# Patient Record
Sex: FEMALE | Race: OTHER | NOT HISPANIC OR LATINO
[De-identification: names, ages, dates, MRNs, and addresses within clinical notes are randomized per-mention and may not be internally consistent; named-entity substitution may affect disease eponyms.]

---

## 2019-10-18 PROBLEM — Z00.00 ENCOUNTER FOR PREVENTIVE HEALTH EXAMINATION: Status: ACTIVE | Noted: 2019-10-18

## 2019-10-22 ENCOUNTER — APPOINTMENT (OUTPATIENT)
Dept: RADIATION ONCOLOGY | Facility: CLINIC | Age: 44
End: 2019-10-22
Payer: COMMERCIAL

## 2019-10-22 VITALS
SYSTOLIC BLOOD PRESSURE: 124 MMHG | TEMPERATURE: 97.88 F | HEIGHT: 65 IN | RESPIRATION RATE: 16 BRPM | DIASTOLIC BLOOD PRESSURE: 83 MMHG | WEIGHT: 181 LBS | HEART RATE: 71 BPM | OXYGEN SATURATION: 99 % | BODY MASS INDEX: 30.16 KG/M2

## 2019-10-22 DIAGNOSIS — G43.909 MIGRAINE, UNSPECIFIED, NOT INTRACTABLE, W/OUT STATUS MIGRAINOSUS: ICD-10-CM

## 2019-10-22 DIAGNOSIS — I10 ESSENTIAL (PRIMARY) HYPERTENSION: ICD-10-CM

## 2019-10-22 DIAGNOSIS — Z78.9 OTHER SPECIFIED HEALTH STATUS: ICD-10-CM

## 2019-10-22 DIAGNOSIS — Z80.3 FAMILY HISTORY OF MALIGNANT NEOPLASM OF BREAST: ICD-10-CM

## 2019-10-22 PROCEDURE — 99244 OFF/OP CNSLTJ NEW/EST MOD 40: CPT | Mod: GC,25

## 2019-10-22 RX ORDER — LISINOPRIL AND HYDROCHLOROTHIAZIDE TABLETS 20; 12.5 MG/1; MG/1
20-12.5 TABLET ORAL
Refills: 0 | Status: ACTIVE | COMMUNITY
Start: 2019-10-22

## 2019-10-22 NOTE — VITALS
[Maximal Pain Intensity: 5/10] : 5/10 [Least Pain Intensity: 0/10] : 0/10 [90: Able to carry normal activity; minor signs or symptoms of disease.] : 90: Able to carry normal activity; minor signs or symptoms of disease.

## 2019-10-22 NOTE — HISTORY OF PRESENT ILLNESS
[FreeTextEntry1] : 44 year old with newly diagnosed WHO grade I meningioma s/p surgical resection.  She presents for discussion re adjuvant radiation therapy.\par \par She presented to [facility] ED on 8/25/19 with the worse headache of her life, "100/10" to her whole head.  On 8/25/19 CT angiography of the head revealed 3.2 cm x 1.9 cm dural based lesion at right frontal lobe with associated vasogenic edema. On 8/28/19 patient had resection of brain lesion at [facility]. Pathology revealed right frontal angiomatous meningioma, WHO grade I.\par \par On 10/1/19 CT head shows: Since 9/11/19 interval decrease in regional edema in right frontal lobe and craniotomy fragment from temporal bone is now near anatomical position. small fluid collection extra axial, adjacent to the craniotomy site. \par \par MRI brain 10/1/19 showed no restricted diffusion. No hemorrhage or focal mass. Nonspecific postoperative peripheral and meningeal enhancement associated with the surgical bed. Presence of residual tumor or infection/abscess cannot be excluded.\par \par Since surgery she still has slight headache to the right sided operative site. Notes left side is slightly weaker since surgery, associated with numbness to the whole side. Numbness comes and goes. Right sided vision is not the same as before surgery. Denies dizziness, confusion, trouble walking. With intermittent nausea and constipation since surgery. Urinating without difficulty.

## 2019-10-22 NOTE — PHYSICAL EXAM
[Sclera] : the sclera and conjunctiva were normal [Outer Ear] : the ears and nose were normal in appearance [Extraocular Movements] : extraocular movements were intact [Normal] : oriented to person, place and time, the affect was normal, the mood was normal and not anxious

## 2019-10-22 NOTE — LETTER CLOSING
[Consult Closing:] : Thank you for allowing me to participate in the care of this patient.  If you have any questions, please do not hesitate to contact me. [Sincerely yours,] : Sincerely yours, [FreeTextEntry3] : Norbert Moreira MD\par Attending Physician\par Department of Radiation Medicine\par \par \par

## 2019-10-22 NOTE — REVIEW OF SYSTEMS
[Constipation] : constipation [Insomnia] : insomnia [Negative] : Genitourinary [Fever] : no fever [Chills] : no chills [Night Sweats] : no night sweats [Fatigue] : no fatigue [Loss of Hearing] : no loss of hearing [Dysphagia] : no dysphagia [Shortness Of Breath] : no shortness of breath [Diarrhea] : no diarrhea [Cough] : no cough [Confused] : no confusion [Dizziness] : no dizziness [Fainting] : no fainting [Difficulty Walking] : no difficulty walking [Anxiety] : no anxiety [Hot Flashes] : no hot flashes [Depression] : no depression [Muscle Weakness] : no muscle weakness [FreeTextEntry3] : right sided vision slightly blurry after surgery [FreeTextEntry7] : intermittent nausea [FreeTextEntry9] : left maria guadalupe weak after surgery [de-identified] : headaches up to 5/10 to right head operative site. improved with tylenol. Has not had a migraine since surgery

## 2020-01-07 ENCOUNTER — APPOINTMENT (OUTPATIENT)
Dept: RADIATION ONCOLOGY | Facility: CLINIC | Age: 45
End: 2020-01-07
Payer: COMMERCIAL

## 2020-01-07 VITALS
SYSTOLIC BLOOD PRESSURE: 152 MMHG | HEART RATE: 74 BPM | DIASTOLIC BLOOD PRESSURE: 91 MMHG | OXYGEN SATURATION: 99 % | RESPIRATION RATE: 16 BRPM | TEMPERATURE: 98.11 F

## 2020-01-07 PROCEDURE — 99212 OFFICE O/P EST SF 10 MIN: CPT

## 2020-01-11 NOTE — HISTORY OF PRESENT ILLNESS
[FreeTextEntry1] : 44 year old with newly diagnosed WHO grade I meningioma s/p surgical resection.  She presented to me initially on 10/22/19 for discussion re adjuvant radiation therapy.\par \par RELEVANT MEDICAL HISTORY\par She presented to [facility] ED on 8/25/19 with the worse headache of her life, "100/10" to her whole head.  On 8/25/19 CT angiography of the head revealed 3.2 cm x 1.9 cm dural based lesion at right frontal lobe with associated vasogenic edema. On 8/28/19 patient had resection of brain lesion at [place]. Pathology revealed right frontal angiomatous meningioma, WHO grade I.\par \par On 10/1/19 CT head shows: Since 9/11/19 interval decrease in regional edema in right frontal lobe and craniotomy fragment from temporal bone is now near anatomical position. small fluid collection extra axial, adjacent to the craniotomy site. \par \par MRI brain 10/1/19 showed no restricted diffusion. No hemorrhage or focal mass. Nonspecific postoperative peripheral and meningeal enhancement associated with the surgical bed. Presence of residual tumor or infection/abscess cannot be excluded.\par \par 10/22/19- Since surgery she still has slight headache to the right sided operative site. Notes left side is slightly weaker since surgery, associated with numbness to the whole side. Numbness comes and goes. Right sided vision is not the same as before surgery. Denies dizziness, confusion, trouble walking. With intermittent nausea and constipation since surgery. Urinating without difficulty. At this time she was advised to have a 6 month post surgical MRI and then see us for follow up. \par \par 1/7/19- Ms. Melendez presents today for follow up. She is feeling very well today. Her left sided weakness has completely improved. She has not had any migraines since surgery, has had no headaches. 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 Has not yet had an MRI.

## 2020-01-11 NOTE — PHYSICAL EXAM
[Sclera] : the sclera and conjunctiva were normal [Extraocular Movements] : extraocular movements were intact [Outer Ear] : the ears and nose were normal in appearance [Normal] : no joint swelling, no clubbing or cyanosis of the fingernails and muscle strength and tone were normal [de-identified] : well healed surgical scar to right front of head

## 2020-01-11 NOTE — REVIEW OF SYSTEMS
[Constipation] : constipation [Negative] : Respiratory [Fever] : no fever [Chills] : no chills [Night Sweats] : no night sweats [Fatigue] : no fatigue [Dysphagia] : no dysphagia [Loss of Hearing] : no loss of hearing [Shortness Of Breath] : no shortness of breath [Diarrhea] : no diarrhea [Cough] : no cough [Confused] : no confusion [Fainting] : no fainting [Dizziness] : no dizziness [Difficulty Walking] : no difficulty walking [Insomnia] : no insomnia [Anxiety] : no anxiety [Depression] : no depression [Hot Flashes] : no hot flashes [Muscle Weakness] : no muscle weakness [FreeTextEntry3] : vision on right side is now back to normal [FreeTextEntry7] : intermittent nausea [de-identified] : denies headaches, migrained.  [FreeTextEntry9] : left sided weakness is now 100% improved [de-identified] : no longer struggling with insomnia

## 2020-03-12 ENCOUNTER — FORM ENCOUNTER (OUTPATIENT)
Age: 45
End: 2020-03-12

## 2020-03-13 ENCOUNTER — OUTPATIENT (OUTPATIENT)
Dept: OUTPATIENT SERVICES | Facility: HOSPITAL | Age: 45
LOS: 1 days | End: 2020-03-13
Payer: COMMERCIAL

## 2020-03-13 ENCOUNTER — APPOINTMENT (OUTPATIENT)
Dept: MRI IMAGING | Facility: CLINIC | Age: 45
End: 2020-03-13
Payer: COMMERCIAL

## 2020-03-13 DIAGNOSIS — D32.9 BENIGN NEOPLASM OF MENINGES, UNSPECIFIED: ICD-10-CM

## 2020-03-13 PROCEDURE — 70553 MRI BRAIN STEM W/O & W/DYE: CPT

## 2020-03-13 PROCEDURE — 70553 MRI BRAIN STEM W/O & W/DYE: CPT | Mod: 26

## 2020-03-13 PROCEDURE — A9585: CPT

## 2020-07-07 ENCOUNTER — APPOINTMENT (OUTPATIENT)
Dept: RADIATION ONCOLOGY | Facility: CLINIC | Age: 45
End: 2020-07-07
Payer: COMMERCIAL

## 2020-07-07 PROCEDURE — 99024 POSTOP FOLLOW-UP VISIT: CPT

## 2020-07-07 RX ORDER — AMLODIPINE BESYLATE 5 MG/1
5 TABLET ORAL
Refills: 0 | Status: DISCONTINUED | COMMUNITY
Start: 2019-10-22 | End: 2020-07-07

## 2020-07-08 NOTE — PHYSICAL EXAM
[Extraocular Movements] : extraocular movements were intact [de-identified] : UNABLE TO DO physical exam due to telehealth

## 2020-07-08 NOTE — HISTORY OF PRESENT ILLNESS
[Home] : at home, [unfilled] , at the time of the visit. [Medical Office: ([facility])___] : at the medical office located in  [Verbal consent obtained from patient] : the patient, [unfilled] [FreeTextEntry1] : 44 year old with newly diagnosed WHO grade I meningioma s/p surgical resection.  She presented to me initially on 10/22/19 for discussion re adjuvant radiation therapy.\par \par RELEVANT MEDICAL HISTORY\par She presented to [facility] ED on 8/25/19 with the worse headache of her life, "100/10" to her whole head.  On 8/25/19 CT angiography of the head revealed 3.2 cm x 1.9 cm dural based lesion at right frontal lobe with associated vasogenic edema. On 8/28/19 patient had resection of brain lesion at [place]. Pathology revealed right frontal angiomatous meningioma, WHO grade I.\par \par On 10/1/19 CT head shows: Since 9/11/19 interval decrease in regional edema in right frontal lobe and craniotomy fragment from temporal bone is now near anatomical position. small fluid collection extra axial, adjacent to the craniotomy site. \par \par MRI brain 10/1/19 showed no restricted diffusion. No hemorrhage or focal mass. Nonspecific postoperative peripheral and meningeal enhancement associated with the surgical bed. Presence of residual tumor or infection/abscess cannot be excluded.\par \par 10/22/19- Since surgery she still has slight headache to the right sided operative site. Notes left side is slightly weaker since surgery, associated with numbness to the whole side. Numbness comes and goes. Right sided vision is not the same as before surgery. Denies dizziness, confusion, trouble walking. With intermittent nausea and constipation since surgery. Urinating without difficulty. At this time she was advised to have a 6 month post surgical MRI and then see us for follow up. \par \par 1/7/19- Ms. Melendez presents today for follow up. She is feeling very well today. Her left sided weakness has completely improved. She has not had any migraines since surgery, has had no headaches. 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 Melendez presents today for follow up. She is seen today through TELEPHONE follow up, as she had some difficulty with using the telehealth platform.  \par She underwent a repeat MRI on 3/13/2020 which showed resolving postop changes with no evidence of recurrent or residual neoplasm. \par The pathology from her surgery was reviewed in 9/2019 at [facility]. review showed angiomatous meningioma, WHO grade I. \par A request for dotatate PET scan was submitted to her insurance however this was denied.\par \par Today she feels overall well. Denies headaches, focal weakness, numbness, tingling, nausea, vomiting. Continues to work

## 2020-07-08 NOTE — REVIEW OF SYSTEMS
[Constipation] : constipation [Negative] : Cardiovascular [Chills] : no chills [Fever] : no fever [Fatigue] : no fatigue [Night Sweats] : no night sweats [Dysphagia] : no dysphagia [Loss of Hearing] : no loss of hearing [Shortness Of Breath] : no shortness of breath [Diarrhea] : no diarrhea [Cough] : no cough [Fainting] : no fainting [Dizziness] : no dizziness [Confused] : no confusion [Difficulty Walking] : no difficulty walking [Anxiety] : no anxiety [Insomnia] : no insomnia [Hot Flashes] : no hot flashes [Depression] : no depression [Muscle Weakness] : no muscle weakness [FreeTextEntry9] : left sided weakness is now 100% improved [FreeTextEntry3] : right sided vision now normal [FreeTextEntry7] : intermittent nausea [de-identified] : denies headaches, migraines [de-identified] : no longer struggling with insomnia

## 2021-03-15 ENCOUNTER — APPOINTMENT (OUTPATIENT)
Dept: MRI IMAGING | Facility: IMAGING CENTER | Age: 46
End: 2021-03-15
Payer: COMMERCIAL

## 2021-03-15 ENCOUNTER — OUTPATIENT (OUTPATIENT)
Dept: OUTPATIENT SERVICES | Facility: HOSPITAL | Age: 46
LOS: 1 days | End: 2021-03-15
Payer: COMMERCIAL

## 2021-03-15 DIAGNOSIS — D32.9 BENIGN NEOPLASM OF MENINGES, UNSPECIFIED: ICD-10-CM

## 2021-03-15 DIAGNOSIS — Z00.8 ENCOUNTER FOR OTHER GENERAL EXAMINATION: ICD-10-CM

## 2021-03-15 PROCEDURE — A9585: CPT

## 2021-03-15 PROCEDURE — 70553 MRI BRAIN STEM W/O & W/DYE: CPT | Mod: 26

## 2021-03-15 PROCEDURE — 70553 MRI BRAIN STEM W/O & W/DYE: CPT

## 2021-03-16 ENCOUNTER — APPOINTMENT (OUTPATIENT)
Dept: RADIATION ONCOLOGY | Facility: CLINIC | Age: 46
End: 2021-03-16
Payer: COMMERCIAL

## 2021-03-16 PROCEDURE — 99442: CPT

## 2021-03-17 NOTE — REVIEW OF SYSTEMS
[Negative] : Genitourinary [Fever] : no fever [Chills] : no chills [Night Sweats] : no night sweats [Fatigue] : no fatigue [Dysphagia] : no dysphagia [Loss of Hearing] : no loss of hearing [Chest Pain] : no chest pain [Palpitations] : no palpitations [Shortness Of Breath] : no shortness of breath [Cough] : no cough [Abdominal Pain] : no abdominal pain [Vomiting] : no vomiting [Constipation] : no constipation [Diarrhea] : no diarrhea [Confused] : no confusion [Dizziness] : no dizziness [Fainting] : no fainting [Difficulty Walking] : no difficulty walking [Insomnia] : no insomnia [Anxiety] : no anxiety [Depression] : no depression [Hot Flashes] : no hot flashes [Muscle Weakness] : no muscle weakness [FreeTextEntry3] : vision now improved [FreeTextEntry7] : intermittent nausea [FreeTextEntry9] : left sided weakness is now 100% improved [de-identified] : denies headaches, migraines [de-identified] : insomnia now improved

## 2021-03-17 NOTE — HISTORY OF PRESENT ILLNESS
[Other Location: e.g. School (Enter Location, City,State)___] : at [unfilled], at the time of the visit. [Medical Office: ([facility])___] : at the medical office located in  [Verbal consent obtained from patient] : the patient, [unfilled] [FreeTextEntry1] : 44 year old with WHO grade I meningioma s/p surgical resection.  She presented to me initially on 10/22/19 for discussion re adjuvant radiation therapy.\par \par RELEVANT MEDICAL HISTORY\par She presented to [facility] ED on 8/25/19 with the worse headache of her life, "100/10" to her whole head.  On 8/25/19 CT angiography of the head revealed 3.2 cm x 1.9 cm dural based lesion at right frontal lobe with associated vasogenic edema. On 8/28/19 patient had resection of brain lesion at [place]. Pathology revealed right frontal angiomatous meningioma, WHO grade I.\par \par On 10/1/19 CT head shows: Since 9/11/19 interval decrease in regional edema in right frontal lobe and craniotomy fragment from temporal bone is now near anatomical position. small fluid collection extra axial, adjacent to the craniotomy site. \par \par MRI brain 10/1/19 showed no restricted diffusion. No hemorrhage or focal mass. Nonspecific postoperative peripheral and meningeal enhancement associated with the surgical bed. Presence of residual tumor or infection/abscess cannot be excluded.\par \par 10/22/19- Since surgery she still has slight headache to the right sided operative site. Notes left side is slightly weaker since surgery, associated with numbness to the whole side. Numbness comes and goes. Right sided vision is not the same as before surgery. Denies dizziness, confusion, trouble walking. With intermittent nausea and constipation since surgery. Urinating without difficulty. At this time she was advised to have a 6 month post surgical MRI and then see us for follow up. \par \par 1/7/19- Ms. Melendez presents today for follow up. She is feeling very well today. Her left sided weakness has completely improved. She has not had any migraines since surgery, has had no headaches. 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 Melendez presents today for follow up. She is seen today through TELEPHONE follow up, as she had some difficulty with using the telehealth platform.  She underwent a repeat MRI on 3/13/2020 which showed resolving postop changes with no evidence of recurrent or residual neoplasm. The pathology from her surgery was reviewed in 9/2019 at [facility]. review showed angiomatous meningioma, WHO grade I. A request for dotatate PET scan was submitted to her insurance however this was denied.\par Today she feels overall well. Denies headaches, focal weakness, numbness, tingling, nausea, vomiting. Continues to work\par \par 3/16/2021- Ms. Melendez presents today for follow up. She is seen today through TELEPHONE for which she provides verbal consent on 3/16/2021 at 11:11 AM. MRI brain done 3/15/2021 showed Right frontoparietal craniotomy. Postop changes subjacent to the craniotomy. No residual neoplasm identified. Minimal postop dural enhancement subjacent to the craniotomy site.\par \par Today she feels very well. No longer taking keppra from her neurosurgeon Dr. Lock. Denies headaches, focal weakness, nausea, vomiting, visual trouble. Now sleeping well. Functioning well and working.

## 2022-09-16 ENCOUNTER — APPOINTMENT (OUTPATIENT)
Dept: MRI IMAGING | Facility: CLINIC | Age: 47
End: 2022-09-16

## 2022-10-18 ENCOUNTER — OUTPATIENT (OUTPATIENT)
Dept: OUTPATIENT SERVICES | Facility: HOSPITAL | Age: 47
LOS: 1 days | End: 2022-10-18
Payer: COMMERCIAL

## 2022-10-18 ENCOUNTER — APPOINTMENT (OUTPATIENT)
Dept: MRI IMAGING | Facility: IMAGING CENTER | Age: 47
End: 2022-10-18

## 2022-10-18 DIAGNOSIS — D32.9 BENIGN NEOPLASM OF MENINGES, UNSPECIFIED: ICD-10-CM

## 2022-10-18 PROCEDURE — A9585: CPT

## 2022-10-18 PROCEDURE — 70553 MRI BRAIN STEM W/O & W/DYE: CPT

## 2022-10-18 PROCEDURE — 70553 MRI BRAIN STEM W/O & W/DYE: CPT | Mod: 26

## 2022-10-20 ENCOUNTER — APPOINTMENT (OUTPATIENT)
Dept: RADIATION ONCOLOGY | Facility: CLINIC | Age: 47
End: 2022-10-20

## 2022-10-20 VITALS
HEIGHT: 65 IN | TEMPERATURE: 96.1 F | SYSTOLIC BLOOD PRESSURE: 189 MMHG | WEIGHT: 183.09 LBS | BODY MASS INDEX: 30.51 KG/M2 | OXYGEN SATURATION: 100 % | RESPIRATION RATE: 17 BRPM | HEART RATE: 86 BPM | DIASTOLIC BLOOD PRESSURE: 116 MMHG

## 2022-10-20 VITALS — DIASTOLIC BLOOD PRESSURE: 100 MMHG | SYSTOLIC BLOOD PRESSURE: 190 MMHG

## 2022-10-20 DIAGNOSIS — D32.9 BENIGN NEOPLASM OF MENINGES, UNSPECIFIED: ICD-10-CM

## 2022-10-20 PROCEDURE — 99212 OFFICE O/P EST SF 10 MIN: CPT

## 2022-10-20 RX ORDER — LABETALOL HYDROCHLORIDE 100 MG/1
100 TABLET, FILM COATED ORAL
Qty: 90 | Refills: 0 | Status: ACTIVE | COMMUNITY
Start: 2022-05-13

## 2022-10-20 NOTE — VITALS
[Least Pain Intensity: 0/10] : 0/10 [90: Able to carry normal activity; minor signs or symptoms of disease.] : 90: Able to carry normal activity; minor signs or symptoms of disease.  [Maximal Pain Intensity: 10/10] : 10/10

## 2022-10-25 PROBLEM — D32.9 MENINGIOMA: Status: ACTIVE | Noted: 2019-10-22

## 2022-10-25 NOTE — HISTORY OF PRESENT ILLNESS
[Other Location: e.g. School (Enter Location, City,State)___] : at [unfilled], at the time of the visit. [Medical Office: ([facility])___] : at the medical office located in  [Verbal consent obtained from patient] : the patient, [unfilled] [FreeTextEntry1] : Ms. Melendez presents for f/u of her meningioma.  She presented to me initially on 10/22/19 for discussion re adjuvant radiation therapy.\par \par RELEVANT MEDICAL HISTORY\par She presented to [facility] ED on 8/25/19 with the worse headache of her life, "100/10" to her whole head.  On 8/25/19 CT angiography of the head revealed 3.2 cm x 1.9 cm dural based lesion at right frontal lobe with associated vasogenic edema. On 8/28/19 patient had resection of brain lesion at [place]. Pathology revealed right frontal angiomatous meningioma, WHO grade I.\par \par On 10/1/19 CT head shows: Since 9/11/19 interval decrease in regional edema in right frontal lobe and craniotomy fragment from temporal bone is now near anatomical position. small fluid collection extra axial, adjacent to the craniotomy site. \par \par MRI brain 10/1/19 showed no restricted diffusion. No hemorrhage or focal mass. Nonspecific postoperative peripheral and meningeal enhancement associated with the surgical bed. Presence of residual tumor or infection/abscess cannot be excluded.\par \par 10/22/19- Since surgery she still has slight headache to the right sided operative site. Notes left side is slightly weaker since surgery, associated with numbness to the whole side. Numbness comes and goes. Right sided vision is not the same as before surgery. Denies dizziness, confusion, trouble walking. With intermittent nausea and constipation since surgery. Urinating without difficulty. At this time she was advised to have a 6 month post surgical MRI and then see us for follow up. \par \par 1/7/19- Ms. Melendez presents today for follow up. She is feeling very well today. Her left sided weakness has completely improved. She has not had any migraines since surgery, has had no headaches. 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 Melendez presents today for follow up. She is seen today through TELEPHONE follow up, as she had some difficulty with using the telehealth platform.  She underwent a repeat MRI on 3/13/2020 which showed resolving postop changes with no evidence of recurrent or residual neoplasm. The pathology from her surgery was reviewed in 9/2019 at [facility]. review showed angiomatous meningioma, WHO grade I. A request for dotatate PET scan was submitted to her insurance however this was denied.\par Today she feels overall well. Denies headaches, focal weakness, numbness, tingling, nausea, vomiting. Continues to work\par \par 3/16/2021- Ms. Melendez presents today for follow up. She is seen today through TELEPHONE for which she provides verbal consent on 3/16/2021 at 11:11 AM. MRI brain done 3/15/2021 showed Right frontoparietal craniotomy. Postop changes subjacent to the craniotomy. No residual neoplasm identified. Minimal postop dural enhancement subjacent to the craniotomy site.\par \par Today she feels very well. No longer taking keppra from her neurosurgeon Dr. Lock. Denies headaches, focal weakness, nausea, vomiting, visual trouble. Now sleeping well. Functioning well and working.\par \par 10/20/2022- Ms. Melendez presents today for follow up. Brain MRI done on 10/18. This was stable. today she feels overall well. BP is high during visit. notes some headaches up to "20/10" over the last year. no nausea, no vomiting. notes some weakness to the left shoulder and shoulder pain sometimes. Continues to work. has a visit with PCP and encouraged ED if chest pain, SOB, headache over the next couple of days.\par

## 2022-10-25 NOTE — REVIEW OF SYSTEMS
[Negative] : Genitourinary [Fever] : no fever [Chills] : no chills [Night Sweats] : no night sweats [Fatigue] : no fatigue [Eye Pain] : no eye pain [Red Eyes] : eyes not red [Dysphagia] : no dysphagia [Loss of Hearing] : no loss of hearing [Chest Pain] : no chest pain [Palpitations] : no palpitations [Shortness Of Breath] : no shortness of breath [Cough] : no cough [Abdominal Pain] : no abdominal pain [Vomiting] : no vomiting [Constipation] : no constipation [Diarrhea] : no diarrhea [Confused] : no confusion [Dizziness] : no dizziness [Fainting] : no fainting [Difficulty Walking] : no difficulty walking [Insomnia] : no insomnia [Anxiety] : no anxiety [Depression] : no depression [Hot Flashes] : no hot flashes [Muscle Weakness] : no muscle weakness [FreeTextEntry7] : intermittent nausea. nausea [FreeTextEntry9] : sometimes left shoulder works. sometimes left arm is a bit harder to lift than right [de-identified] : headaches come and go. sometimes up to "20/10." [de-identified] : insomnia now improved